# Patient Record
Sex: FEMALE | URBAN - METROPOLITAN AREA
[De-identification: names, ages, dates, MRNs, and addresses within clinical notes are randomized per-mention and may not be internally consistent; named-entity substitution may affect disease eponyms.]

---

## 2023-03-17 ENCOUNTER — OFFICE (OUTPATIENT)
Dept: URBAN - METROPOLITAN AREA CLINIC 90 | Facility: CLINIC | Age: 83
Setting detail: OPHTHALMOLOGY
End: 2023-03-17
Payer: MEDICARE

## 2023-03-17 DIAGNOSIS — H02.834: ICD-10-CM

## 2023-03-17 DIAGNOSIS — H25.13: ICD-10-CM

## 2023-03-17 DIAGNOSIS — H40.033: ICD-10-CM

## 2023-03-17 DIAGNOSIS — H02.831: ICD-10-CM

## 2023-03-17 DIAGNOSIS — H18.513: ICD-10-CM

## 2023-03-17 PROCEDURE — 92002 INTRM OPH EXAM NEW PATIENT: CPT | Performed by: OPHTHALMOLOGY

## 2023-03-17 PROCEDURE — 92020 GONIOSCOPY: CPT | Performed by: OPHTHALMOLOGY

## 2023-03-17 PROCEDURE — 76514 ECHO EXAM OF EYE THICKNESS: CPT | Performed by: OPHTHALMOLOGY

## 2023-03-17 ASSESSMENT — REFRACTION_AUTOREFRACTION
OD_SPHERE: +1.75
OD_AXIS: 070
OD_CYLINDER: -1.50
OS_SPHERE: +1.00
OS_CYLINDER: SPH

## 2023-03-17 ASSESSMENT — TONOMETRY
OD_IOP_MMHG: 18
OS_IOP_MMHG: 18

## 2023-03-17 ASSESSMENT — REFRACTION_CURRENTRX
OD_ADD: +2.50
OD_VPRISM_DIRECTION: PROGS
OD_AXIS: 040
OS_AXIS: 107
OS_CYLINDER: -0.50
OD_CYLINDER: -0.75
OD_OVR_VA: 20/
OS_SPHERE: +0.25
OS_OVR_VA: 20/
OS_ADD: +2.50
OS_VPRISM_DIRECTION: PROGS
OD_SPHERE: +1.00

## 2023-03-17 ASSESSMENT — PACHYMETRY
OS_CT_UM: 521
OD_CT_UM: 536
OD_CT_CORRECTION: 1
OS_CT_CORRECTION: 1

## 2023-03-17 ASSESSMENT — LID POSITION - DERMATOCHALASIS
OD_DERMATOCHALASIS: RUL 2+ 3+
OS_DERMATOCHALASIS: LUL 2+ 3+

## 2023-03-17 ASSESSMENT — KERATOMETRY
OS_AXISANGLE_DEGREES: 060
OD_K1POWER_DIOPTERS: 44.75
OS_K2POWER_DIOPTERS: 43.50
OS_K1POWER_DIOPTERS: 43.25
OD_AXISANGLE_DEGREES: 090
OD_K2POWER_DIOPTERS: 44.75

## 2023-03-17 ASSESSMENT — AXIALLENGTH_DERIVED: OD_AL: 22.7726

## 2023-03-17 ASSESSMENT — SPHEQUIV_DERIVED: OD_SPHEQUIV: 1

## 2023-03-17 ASSESSMENT — VISUAL ACUITY
OS_BCVA: 20/60-
OD_BCVA: 20/60+2

## 2024-06-19 ENCOUNTER — OFFICE (OUTPATIENT)
Dept: URBAN - METROPOLITAN AREA CLINIC 90 | Facility: CLINIC | Age: 84
Setting detail: OPHTHALMOLOGY
End: 2024-06-19
Payer: MEDICARE

## 2024-06-19 DIAGNOSIS — H02.834: ICD-10-CM

## 2024-06-19 DIAGNOSIS — H18.513: ICD-10-CM

## 2024-06-19 DIAGNOSIS — H02.831: ICD-10-CM

## 2024-06-19 DIAGNOSIS — H25.13: ICD-10-CM

## 2024-06-19 DIAGNOSIS — H40.033: ICD-10-CM

## 2024-06-19 PROCEDURE — 92014 COMPRE OPH EXAM EST PT 1/>: CPT | Performed by: OPHTHALMOLOGY

## 2024-06-19 ASSESSMENT — LID POSITION - DERMATOCHALASIS
OD_DERMATOCHALASIS: RUL 2+ 3+
OS_DERMATOCHALASIS: LUL 2+ 3+

## 2024-10-30 ENCOUNTER — OFFICE (OUTPATIENT)
Age: 84
Setting detail: OPHTHALMOLOGY
End: 2024-10-30
Payer: MEDICARE

## 2024-10-30 DIAGNOSIS — H25.11: ICD-10-CM

## 2024-10-30 DIAGNOSIS — H25.13: ICD-10-CM

## 2024-10-30 PROCEDURE — 92136 OPHTHALMIC BIOMETRY: CPT | Mod: RT | Performed by: OPHTHALMOLOGY

## 2024-10-30 PROCEDURE — 92136 OPHTHALMIC BIOMETRY: CPT | Mod: TC | Performed by: OPHTHALMOLOGY

## 2024-10-30 ASSESSMENT — REFRACTION_CURRENTRX
OS_VPRISM_DIRECTION: PROGS
OD_OVR_VA: 20/
OS_CYLINDER: 0.00
OS_ADD: +2.50
OD_VPRISM_DIRECTION: PROGS
OS_VPRISM_DIRECTION: PROGS
OS_SPHERE: +0.25
OD_SPHERE: +1.00
OD_ADD: +3.50
OD_VPRISM_DIRECTION: PROGS
OD_SPHERE: +1.00
OS_AXIS: 180
OD_CYLINDER: -0.75
OD_AXIS: 040
OD_AXIS: 045
OD_ADD: +2.50
OS_OVR_VA: 20/
OS_ADD: +3.25
OD_OVR_VA: 20/
OS_OVR_VA: 20/
OS_SPHERE: -0.25
OS_AXIS: 107
OD_CYLINDER: -0.75
OS_CYLINDER: -0.50

## 2024-10-30 ASSESSMENT — REFRACTION_MANIFEST
OS_SPHERE: +1.25
OD_SPHERE: +1.50
OS_CYLINDER: -0.75
OD_CYLINDER: -1.75
OD_VA1: 20/100+
OS_VA1: 20/60-
OS_AXIS: 180
OD_AXIS: 080

## 2024-10-30 ASSESSMENT — REFRACTION_AUTOREFRACTION
OS_SPHERE: +1.25
OD_SPHERE: +2.00
OS_AXIS: 180
OD_AXIS: 078
OD_CYLINDER: -1.75
OS_CYLINDER: -0.75

## 2024-10-30 ASSESSMENT — CONFRONTATIONAL VISUAL FIELD TEST (CVF)
OS_FINDINGS: FULL
OD_FINDINGS: FULL

## 2024-10-30 ASSESSMENT — KERATOMETRY
OS_K1POWER_DIOPTERS: 43.50
OD_K1POWER_DIOPTERS: 44.25
OS_AXISANGLE_DEGREES: 068
OS_K2POWER_DIOPTERS: 43.75
OD_K2POWER_DIOPTERS: 44.75
OD_AXISANGLE_DEGREES: 010

## 2024-10-30 ASSESSMENT — VISUAL ACUITY
OD_BCVA: 20/60-1
OS_BCVA: 20/250

## 2024-11-04 ENCOUNTER — AMBULATORY SURGERY CENTER (OUTPATIENT)
Dept: URBAN - METROPOLITAN AREA SURGERY 25 | Facility: SURGERY | Age: 84
Setting detail: OPHTHALMOLOGY
End: 2024-11-04
Payer: MEDICARE

## 2024-11-04 DIAGNOSIS — H52.211: ICD-10-CM

## 2024-11-04 DIAGNOSIS — H25.11: ICD-10-CM

## 2024-11-04 PROCEDURE — FEMTO FEMTOSECOND LASER: Mod: GY | Performed by: OPHTHALMOLOGY

## 2024-11-04 PROCEDURE — 66984 XCAPSL CTRC RMVL W/O ECP: CPT | Mod: RT | Performed by: OPHTHALMOLOGY

## 2024-11-05 ENCOUNTER — OFFICE (OUTPATIENT)
Age: 84
Setting detail: OPHTHALMOLOGY
End: 2024-11-05
Payer: MEDICARE

## 2024-11-05 ENCOUNTER — RX ONLY (RX ONLY)
Age: 84
End: 2024-11-05

## 2024-11-05 DIAGNOSIS — Z96.1: ICD-10-CM

## 2024-11-05 PROCEDURE — 99024 POSTOP FOLLOW-UP VISIT: CPT | Performed by: OPHTHALMOLOGY

## 2024-11-05 ASSESSMENT — REFRACTION_MANIFEST
OD_AXIS: 080
OD_VA1: 20/100+
OD_VA1: 20/100+
OS_AXIS: 180
OD_SPHERE: +1.50
OD_SPHERE: +1.50
OS_VA1: 20/60-
OS_CYLINDER: -0.75
OD_CYLINDER: -1.75
OS_SPHERE: +1.25
OD_CYLINDER: -1.75
OS_SPHERE: +1.25
OD_AXIS: 080
OS_AXIS: 180
OS_CYLINDER: -0.75
OS_VA1: 20/60-

## 2024-11-05 ASSESSMENT — PACHYMETRY
OS_CT_CORRECTION: 1
OD_CT_UM: 536
OD_CT_CORRECTION: 1
OS_CT_UM: 521

## 2024-11-05 ASSESSMENT — REFRACTION_CURRENTRX
OD_SPHERE: +1.00
OD_VPRISM_DIRECTION: PROGS
OD_OVR_VA: 20/
OS_SPHERE: +0.25
OD_CYLINDER: -0.75
OS_CYLINDER: 0.00
OD_SPHERE: +1.00
OS_CYLINDER: -0.50
OD_OVR_VA: 20/
OS_OVR_VA: 20/
OS_OVR_VA: 20/
OD_AXIS: 045
OD_SPHERE: +1.00
OS_SPHERE: -0.25
OS_SPHERE: -0.25
OS_SPHERE: +0.25
OS_ADD: +2.50
OD_VPRISM_DIRECTION: PROGS
OS_VPRISM_DIRECTION: PROGS
OD_OVR_VA: 20/
OD_CYLINDER: -0.75
OD_AXIS: 040
OS_ADD: +2.50
OS_ADD: +3.25
OD_ADD: +2.50
OD_CYLINDER: -0.75
OD_SPHERE: +1.00
OD_OVR_VA: 20/
OS_VPRISM_DIRECTION: PROGS
OD_ADD: +3.50
OD_CYLINDER: -0.75
OD_AXIS: 045
OS_OVR_VA: 20/
OS_AXIS: 180
OS_AXIS: 107
OD_VPRISM_DIRECTION: PROGS
OS_ADD: +3.25
OS_CYLINDER: -0.50
OS_VPRISM_DIRECTION: PROGS
OS_CYLINDER: 0.00
OS_AXIS: 107
OS_AXIS: 180
OD_AXIS: 040
OD_VPRISM_DIRECTION: PROGS
OD_ADD: +3.50
OS_OVR_VA: 20/
OD_ADD: +2.50
OS_VPRISM_DIRECTION: PROGS

## 2024-11-05 ASSESSMENT — KERATOMETRY
OS_AXISANGLE_DEGREES: 068
OD_AXISANGLE_DEGREES: 010
OD_K1POWER_DIOPTERS: 44.25
OS_K2POWER_DIOPTERS: 43.75
OS_AXISANGLE_DEGREES: 068
OS_K2POWER_DIOPTERS: 43.75
OD_K1POWER_DIOPTERS: 44.25
OD_AXISANGLE_DEGREES: 010
OS_K1POWER_DIOPTERS: 43.50
OD_K2POWER_DIOPTERS: 44.75
OS_K1POWER_DIOPTERS: 43.50
OD_K2POWER_DIOPTERS: 44.75

## 2024-11-05 ASSESSMENT — CORNEAL EDEMA - MICROCYSTIC EPITHELIAL EDEMA (MCE): OD_MCE: 3+

## 2024-11-05 ASSESSMENT — VISUAL ACUITY
OS_BCVA: 20/HM
OD_BCVA: 20/60-2

## 2024-11-05 ASSESSMENT — REFRACTION_AUTOREFRACTION
OD_SPHERE: +2.00
OS_CYLINDER: -0.75
OD_AXIS: 078
OS_SPHERE: +1.25
OS_AXIS: 180
OD_CYLINDER: -1.75

## 2024-11-05 ASSESSMENT — CONFRONTATIONAL VISUAL FIELD TEST (CVF)
OD_FINDINGS: FULL
OS_FINDINGS: FULL

## 2024-11-06 ENCOUNTER — OFFICE (OUTPATIENT)
Age: 84
Setting detail: OPHTHALMOLOGY
End: 2024-11-06
Payer: MEDICARE

## 2024-11-06 DIAGNOSIS — Z96.1: ICD-10-CM

## 2024-11-06 PROCEDURE — 99024 POSTOP FOLLOW-UP VISIT: CPT | Performed by: OPHTHALMOLOGY

## 2024-11-06 ASSESSMENT — LID POSITION - DERMATOCHALASIS
OS_DERMATOCHALASIS: LUL 2+ 3+
OD_DERMATOCHALASIS: RUL 2+ 3+

## 2024-11-06 ASSESSMENT — PACHYMETRY
OD_CT_UM: 536
OS_CT_CORRECTION: 1
OD_CT_CORRECTION: 1
OS_CT_UM: 521

## 2024-11-06 ASSESSMENT — CORNEAL EDEMA - MICROCYSTIC EPITHELIAL EDEMA (MCE): OD_MCE: 3+

## 2024-11-07 ENCOUNTER — INPATIENT (INPATIENT)
Facility: HOSPITAL | Age: 84
LOS: 0 days | Discharge: HOME | DRG: 117 | End: 2024-11-07
Attending: OPHTHALMOLOGY | Admitting: OPHTHALMOLOGY
Payer: MEDICARE

## 2024-11-07 VITALS
HEIGHT: 60 IN | DIASTOLIC BLOOD PRESSURE: 84 MMHG | TEMPERATURE: 98 F | SYSTOLIC BLOOD PRESSURE: 138 MMHG | OXYGEN SATURATION: 98 % | RESPIRATION RATE: 16 BRPM | WEIGHT: 98.99 LBS | HEART RATE: 84 BPM

## 2024-11-07 VITALS
HEART RATE: 75 BPM | RESPIRATION RATE: 17 BRPM | DIASTOLIC BLOOD PRESSURE: 75 MMHG | OXYGEN SATURATION: 99 % | SYSTOLIC BLOOD PRESSURE: 140 MMHG

## 2024-11-07 DIAGNOSIS — H59.021 CATARACT (LENS) FRAGMENTS IN EYE FOLLOWING CATARACT SURGERY, RIGHT EYE: ICD-10-CM

## 2024-11-07 DIAGNOSIS — Z98.49 CATARACT EXTRACTION STATUS, UNSPECIFIED EYE: Chronic | ICD-10-CM

## 2024-11-07 LAB
ALBUMIN SERPL ELPH-MCNC: 3.6 G/DL — SIGNIFICANT CHANGE UP (ref 3.3–5)
ALP SERPL-CCNC: 58 U/L — SIGNIFICANT CHANGE UP (ref 30–120)
ALT FLD-CCNC: 23 U/L — SIGNIFICANT CHANGE UP (ref 10–60)
ANION GAP SERPL CALC-SCNC: 8 MMOL/L — SIGNIFICANT CHANGE UP (ref 5–17)
APTT BLD: 29.3 SEC — SIGNIFICANT CHANGE UP (ref 24.5–35.6)
AST SERPL-CCNC: 22 U/L — SIGNIFICANT CHANGE UP (ref 10–40)
BASOPHILS # BLD AUTO: 0.06 K/UL — SIGNIFICANT CHANGE UP (ref 0–0.2)
BASOPHILS NFR BLD AUTO: 1.1 % — SIGNIFICANT CHANGE UP (ref 0–2)
BILIRUB SERPL-MCNC: 0.5 MG/DL — SIGNIFICANT CHANGE UP (ref 0.2–1.2)
BUN SERPL-MCNC: 20 MG/DL — SIGNIFICANT CHANGE UP (ref 7–23)
CALCIUM SERPL-MCNC: 9.7 MG/DL — SIGNIFICANT CHANGE UP (ref 8.4–10.5)
CHLORIDE SERPL-SCNC: 101 MMOL/L — SIGNIFICANT CHANGE UP (ref 96–108)
CO2 SERPL-SCNC: 28 MMOL/L — SIGNIFICANT CHANGE UP (ref 22–31)
CREAT SERPL-MCNC: 0.96 MG/DL — SIGNIFICANT CHANGE UP (ref 0.5–1.3)
EGFR: 59 ML/MIN/1.73M2 — LOW
EOSINOPHIL # BLD AUTO: 0.09 K/UL — SIGNIFICANT CHANGE UP (ref 0–0.5)
EOSINOPHIL NFR BLD AUTO: 1.7 % — SIGNIFICANT CHANGE UP (ref 0–6)
GLUCOSE SERPL-MCNC: 96 MG/DL — SIGNIFICANT CHANGE UP (ref 70–99)
HCT VFR BLD CALC: 37.1 % — SIGNIFICANT CHANGE UP (ref 34.5–45)
HGB BLD-MCNC: 12.7 G/DL — SIGNIFICANT CHANGE UP (ref 11.5–15.5)
IMM GRANULOCYTES NFR BLD AUTO: 0.2 % — SIGNIFICANT CHANGE UP (ref 0–0.9)
INR BLD: 1.03 RATIO — SIGNIFICANT CHANGE UP (ref 0.85–1.16)
LYMPHOCYTES # BLD AUTO: 1.05 K/UL — SIGNIFICANT CHANGE UP (ref 1–3.3)
LYMPHOCYTES # BLD AUTO: 19.3 % — SIGNIFICANT CHANGE UP (ref 13–44)
MCHC RBC-ENTMCNC: 31.4 PG — SIGNIFICANT CHANGE UP (ref 27–34)
MCHC RBC-ENTMCNC: 34.2 G/DL — SIGNIFICANT CHANGE UP (ref 32–36)
MCV RBC AUTO: 91.6 FL — SIGNIFICANT CHANGE UP (ref 80–100)
MONOCYTES # BLD AUTO: 0.61 K/UL — SIGNIFICANT CHANGE UP (ref 0–0.9)
MONOCYTES NFR BLD AUTO: 11.2 % — SIGNIFICANT CHANGE UP (ref 2–14)
NEUTROPHILS # BLD AUTO: 3.61 K/UL — SIGNIFICANT CHANGE UP (ref 1.8–7.4)
NEUTROPHILS NFR BLD AUTO: 66.5 % — SIGNIFICANT CHANGE UP (ref 43–77)
NRBC # BLD: 0 /100 WBCS — SIGNIFICANT CHANGE UP (ref 0–0)
PLATELET # BLD AUTO: 226 K/UL — SIGNIFICANT CHANGE UP (ref 150–400)
POTASSIUM SERPL-MCNC: 3.5 MMOL/L — SIGNIFICANT CHANGE UP (ref 3.5–5.3)
POTASSIUM SERPL-SCNC: 3.5 MMOL/L — SIGNIFICANT CHANGE UP (ref 3.5–5.3)
PROT SERPL-MCNC: 7.5 G/DL — SIGNIFICANT CHANGE UP (ref 6–8.3)
PROTHROM AB SERPL-ACNC: 12.1 SEC — SIGNIFICANT CHANGE UP (ref 9.9–13.4)
RBC # BLD: 4.05 M/UL — SIGNIFICANT CHANGE UP (ref 3.8–5.2)
RBC # FLD: 13.3 % — SIGNIFICANT CHANGE UP (ref 10.3–14.5)
SODIUM SERPL-SCNC: 137 MMOL/L — SIGNIFICANT CHANGE UP (ref 135–145)
WBC # BLD: 5.43 K/UL — SIGNIFICANT CHANGE UP (ref 3.8–10.5)
WBC # FLD AUTO: 5.43 K/UL — SIGNIFICANT CHANGE UP (ref 3.8–10.5)

## 2024-11-07 PROCEDURE — 99285 EMERGENCY DEPT VISIT HI MDM: CPT

## 2024-11-07 PROCEDURE — 99221 1ST HOSP IP/OBS SF/LOW 40: CPT

## 2024-11-07 PROCEDURE — 67036 REMOVAL OF INNER EYE FLUID: CPT | Mod: AS,RT

## 2024-11-07 PROCEDURE — 93010 ELECTROCARDIOGRAM REPORT: CPT

## 2024-11-07 PROCEDURE — 71045 X-RAY EXAM CHEST 1 VIEW: CPT | Mod: 26

## 2024-11-07 DEVICE — IMPLANTABLE DEVICE: Type: IMPLANTABLE DEVICE | Site: RIGHT | Status: FUNCTIONAL

## 2024-11-07 ASSESSMENT — REFRACTION_CURRENTRX
OS_AXIS: 107
OD_SPHERE: +1.00
OS_CYLINDER: 0.00
OS_ADD: +3.25
OD_OVR_VA: 20/
OS_AXIS: 180
OD_SPHERE: +1.00
OD_VPRISM_DIRECTION: PROGS
OD_AXIS: 040
OS_OVR_VA: 20/
OS_OVR_VA: 20/
OD_CYLINDER: -0.75
OS_SPHERE: +0.25
OD_CYLINDER: -0.75
OS_ADD: +2.50
OD_OVR_VA: 20/
OD_AXIS: 045
OS_VPRISM_DIRECTION: PROGS
OD_ADD: +3.50
OS_SPHERE: -0.25
OD_ADD: +2.50
OS_CYLINDER: -0.50
OD_VPRISM_DIRECTION: PROGS
OS_VPRISM_DIRECTION: PROGS

## 2024-11-07 ASSESSMENT — REFRACTION_MANIFEST
OS_CYLINDER: -0.75
OS_AXIS: 180
OS_SPHERE: +1.25
OD_SPHERE: +1.50
OD_VA1: 20/100+
OD_CYLINDER: -1.75
OS_VA1: 20/60-
OD_AXIS: 080

## 2024-11-07 ASSESSMENT — REFRACTION_AUTOREFRACTION
OD_AXIS: 078
OD_SPHERE: +2.00
OS_CYLINDER: -0.75
OD_CYLINDER: -1.75
OS_SPHERE: +1.25
OS_AXIS: 180

## 2024-11-07 ASSESSMENT — KERATOMETRY
OS_K1POWER_DIOPTERS: 43.50
OD_K1POWER_DIOPTERS: 44.25
OD_AXISANGLE_DEGREES: 010
OD_K2POWER_DIOPTERS: 44.75
OS_AXISANGLE_DEGREES: 068
OS_K2POWER_DIOPTERS: 43.75

## 2024-11-07 ASSESSMENT — VISUAL ACUITY
OD_BCVA: 20/60-2
OS_BCVA: 20/HM

## 2024-11-07 NOTE — H&P ADULT - ASSESSMENT
Status post cataract surgery with failed removal of lens   Asymptomatic at the moment   S/p cataract surgery 3 days ago   Pt is scheduled for lens removal today with insertion of intraocular lens   Labs reviewed   Pain management as per optho   Monitor vitals     DVT prophylaxis   No need, pt will be discharged after surgery

## 2024-11-07 NOTE — ED PROVIDER NOTE - EYES, MLM
Clear bilaterally, pupils equal, round and reactive to light. R eye slight watery, no acute erythema/ edema.

## 2024-11-07 NOTE — ASU PATIENT PROFILE, ADULT - VISION (WITH CORRECTIVE LENSES IF THE PATIENT USUALLY WEARS THEM):
Right eye/Partially impaired: cannot see medication labels or newsprint, but can see obstacles in path, and the surrounding layout; can count fingers at arm's length

## 2024-11-07 NOTE — ASU DISCHARGE PLAN (ADULT/PEDIATRIC) - CARE PROVIDER_API CALL
Robbin Polk  Ophthalmology  [address] [place] [address], [address]  [place] [address]  Phone: [phone]  Fax: [phone]  Scheduled Appointment: 11/08/2024

## 2024-11-07 NOTE — ASU PATIENT PROFILE, ADULT - ADDITIONAL COMMENTS
Patient has periods of confusion as per daughter. The Daughter is her caregiver and will translate for her. Patient is for emergency right eye sx. As per Daughter the patient has periods of confusion, and this is her baseline.

## 2024-11-07 NOTE — H&P ADULT - NSHPREVIEWOFSYSTEMS_GEN_ALL_CORE
REVIEW OF SYSTEMS:  CONSTITUTIONAL: No fever, weight loss, or fatigue  ENMT:  No difficulty hearing, tinnitus, vertigo; No sinus or throat pain  RESPIRATORY: No cough, wheezing, chills or hemoptysis; No shortness of breath  CARDIOVASCULAR: No chest pain, palpitations, dizziness, or leg swelling  GASTROINTESTINAL: Admits constipation,  No abdominal or epigastric pain. No nausea, vomiting, or hematemesis; No diarrhea   GENITOURINARY: No dysuria, frequency, hematuria, or incontinence  NEUROLOGICAL: No headaches, memory loss, loss of strength, numbness, or tremors  SKIN: No itching, burning, rashes, or lesions   MUSCULOSKELETAL: No pain or sewlling or redness REVIEW OF SYSTEMS:  CONSTITUTIONAL: No fever, weight loss, or fatigue  ENMT:  No difficulty hearing, tinnitus, vertigo; No sinus or throat pain  EYES: Blurry vision   RESPIRATORY: No cough, wheezing, chills or hemoptysis; No shortness of breath  CARDIOVASCULAR: No chest pain, palpitations, dizziness, or leg swelling  GASTROINTESTINAL: No constipation,  No abdominal or epigastric pain. No nausea, vomiting, or hematemesis; No diarrhea   GENITOURINARY: No dysuria, frequency, hematuria, or incontinence  NEUROLOGICAL: No headaches, memory loss, loss of strength, numbness, or tremors  SKIN: No itching, burning, rashes, or lesions   MUSCULOSKELETAL: No pain or swelling or redness

## 2024-11-07 NOTE — ED PROVIDER NOTE - OBJECTIVE STATEMENT
84-year-old female with no significant past medical history presents with status post cataract surgery 3 days ago, failed removal of the lens.  The patient is now here for lens fragment removal, insertion of intraocular lens.  The patient is not having any pain.  Some decreased vision in the right eye.  No acute nausea or vomiting.  No acute headache.  No other acute complaints.

## 2024-11-07 NOTE — H&P ADULT - HISTORY OF PRESENT ILLNESS
84-year-old female with no significant past medical history presents with status post cataract surgery 3 days ago, failed removal of the lens. The patient is now here for lens fragment removal, insertion of intraocular lens. She has   Denies nausea, vomiting, eye pain, headache, chest pain, palpitations.  84-year-old female with no significant past medical history presents with status post cataract surgery 3 days ago, failed removal of the lens. The patient is now here for lens fragment removal, insertion of intraocular lens. She has blurry vision on the right eye.   Denies nausea, vomiting, eye pain, headache, chest pain, palpitations. Does not take any medications.

## 2024-11-07 NOTE — ASU PATIENT PROFILE, ADULT - FALL HARM RISK - HARM RISK INTERVENTIONS

## 2024-11-07 NOTE — ASU PATIENT PROFILE, ADULT - HEALTHCARE QUESTIONS, PROFILE
will speak with surgeon & anesthesia prior to surgery will speak with surgeon & anesthesia prior to surgery , with Daughter.

## 2024-11-07 NOTE — ASU DISCHARGE PLAN (ADULT/PEDIATRIC) - NS MD DC FALL RISK RISK
For information on Fall & Injury Prevention, visit: [email or web address] OR  [email or web address] OR  https://www.cdc.gov/steadi/patient.html

## 2024-11-07 NOTE — ASU DISCHARGE PLAN (ADULT/PEDIATRIC) - PROCEDURE
Vitrectomy, removal of retained lens with fragmatome, insertion of sutured intraocular lens, right eye

## 2024-11-07 NOTE — ASU PREOP CHECKLIST - 1.
Patient for emergent right eye retina sx. Patient was brought into the emergency room as an add-on. Patient is pleconfused, as per daughter this is the patients baseline. Patient is for emergency right eye sx. As per Daughter the patient has periods of confusion, and this is her baseline.

## 2024-11-07 NOTE — H&P ADULT - NSHPLABSRESULTS_GEN_ALL_CORE
Labs:                                Lactate Trend            CAPILLARY BLOOD GLUCOSE          EKG:   Personally Reviewed:  [ ] YES     Imaging:   Personally Reviewed:  [ ] YES Labs:  Reviewed

## 2024-11-07 NOTE — H&P ADULT - NSHPPHYSICALEXAM_GEN_ALL_CORE
GENERAL: patient appears well, no acute distress, appropriate behavior  EYES: sclera clear, no exudates, PERRLA  ENMT: moist mucous membranes, oropharynx clear without erythema, no exudates  LUNGS:  no increased work of breathing, no wheezing appreciated  HEART: no lower extremity edema appreciated  GASTROINTESTINAL: abdomen is nondistended, no palpable masses appreciated  INTEGUMENT: good skin turgor, warm, dry and intact, no lesions appreciated  MUSCULOSKELETAL: No tenderness or swelling present   NEUROLOGIC: awake, alert, oriented x3, strength no obvious sensory deficits

## 2024-11-07 NOTE — ASU DISCHARGE PLAN (ADULT/PEDIATRIC) - FINANCIAL ASSISTANCE
[facility] provides services at a reduced cost to those who are determined to be eligible through [facility]’s financial assistance program. Information regarding [facility]’s financial assistance program can be found by going to [email or web address] or by calling [phone].

## 2024-11-26 PROCEDURE — 99285 EMERGENCY DEPT VISIT HI MDM: CPT

## 2024-11-26 PROCEDURE — V2632: CPT

## 2024-11-26 PROCEDURE — 85025 COMPLETE CBC W/AUTO DIFF WBC: CPT

## 2024-11-26 PROCEDURE — 85730 THROMBOPLASTIN TIME PARTIAL: CPT

## 2024-11-26 PROCEDURE — 71045 X-RAY EXAM CHEST 1 VIEW: CPT

## 2024-11-26 PROCEDURE — 85610 PROTHROMBIN TIME: CPT

## 2024-11-26 PROCEDURE — 80053 COMPREHEN METABOLIC PANEL: CPT

## 2024-11-26 PROCEDURE — 93005 ELECTROCARDIOGRAM TRACING: CPT

## 2024-11-26 PROCEDURE — 36415 COLL VENOUS BLD VENIPUNCTURE: CPT

## 2024-12-03 ENCOUNTER — RX ONLY (RX ONLY)
Age: 84
End: 2024-12-03

## 2024-12-03 ENCOUNTER — OFFICE (OUTPATIENT)
Facility: LOCATION | Age: 84
Setting detail: OPHTHALMOLOGY
End: 2024-12-03
Payer: MEDICARE

## 2024-12-03 DIAGNOSIS — Z96.1: ICD-10-CM

## 2024-12-03 PROCEDURE — 99024 POSTOP FOLLOW-UP VISIT: CPT | Performed by: OPHTHALMOLOGY

## 2024-12-03 ASSESSMENT — PACHYMETRY
OS_CT_CORRECTION: 1
OD_CT_CORRECTION: 1
OS_CT_UM: 521
OD_CT_UM: 536

## 2024-12-03 ASSESSMENT — CORNEAL EDEMA CLINICAL DESCRIPTION: OD_CORNEALEDEMA: ABSENT

## 2024-12-03 ASSESSMENT — TONOMETRY: OD_IOP_MMHG: 16

## 2024-12-03 ASSESSMENT — CORNEAL EDEMA - MICROCYSTIC EPITHELIAL EDEMA (MCE): OD_MCE: ABSENT

## 2024-12-04 ASSESSMENT — REFRACTION_MANIFEST
OD_CYLINDER: -1.75
OS_AXIS: 180
OD_VA1: 20/100+
OD_AXIS: 080
OS_CYLINDER: -0.75
OD_SPHERE: +1.50
OS_SPHERE: +1.25
OS_VA1: 20/60-

## 2024-12-04 ASSESSMENT — REFRACTION_AUTOREFRACTION
OD_SPHERE: +2.00
OD_CYLINDER: -1.75
OS_CYLINDER: -0.75
OS_SPHERE: +1.25
OD_AXIS: 078
OS_AXIS: 180

## 2024-12-04 ASSESSMENT — REFRACTION_CURRENTRX
OD_ADD: +2.50
OS_ADD: +2.50
OD_ADD: +3.50
OD_AXIS: 045
OD_SPHERE: +1.00
OD_AXIS: 040
OS_AXIS: 180
OS_SPHERE: +0.25
OD_OVR_VA: 20/
OS_CYLINDER: 0.00
OD_SPHERE: +1.00
OS_OVR_VA: 20/
OD_CYLINDER: -0.75
OS_SPHERE: -0.25
OS_OVR_VA: 20/
OS_CYLINDER: -0.50
OS_VPRISM_DIRECTION: PROGS
OD_VPRISM_DIRECTION: PROGS
OS_VPRISM_DIRECTION: PROGS
OD_CYLINDER: -0.75
OS_AXIS: 107
OD_VPRISM_DIRECTION: PROGS
OD_OVR_VA: 20/
OS_ADD: +3.25

## 2024-12-04 ASSESSMENT — KERATOMETRY
OD_K2POWER_DIOPTERS: 44.75
OS_AXISANGLE_DEGREES: 068
OD_K1POWER_DIOPTERS: 44.25
OS_K2POWER_DIOPTERS: 43.75
OD_AXISANGLE_DEGREES: 010
OS_K1POWER_DIOPTERS: 43.50

## 2024-12-04 ASSESSMENT — VISUAL ACUITY
OD_BCVA: 20/
OS_BCVA: 20/40-1

## 2025-01-08 ENCOUNTER — OFFICE (OUTPATIENT)
Facility: LOCATION | Age: 85
Setting detail: OPHTHALMOLOGY
End: 2025-01-08
Payer: MEDICARE

## 2025-01-08 DIAGNOSIS — Z96.1: ICD-10-CM

## 2025-01-08 PROCEDURE — 99024 POSTOP FOLLOW-UP VISIT: CPT | Performed by: OPHTHALMOLOGY

## 2025-01-08 ASSESSMENT — PACHYMETRY
OS_CT_UM: 521
OS_CT_CORRECTION: 1
OD_CT_CORRECTION: 1
OD_CT_UM: 536

## 2025-01-08 ASSESSMENT — REFRACTION_MANIFEST
OS_CYLINDER: -0.75
OS_AXIS: 180
OD_CYLINDER: -1.75
OD_AXIS: 080
OD_SPHERE: +1.50
OS_SPHERE: +1.25
OS_VA1: 20/60-
OD_VA1: 20/100+

## 2025-01-08 ASSESSMENT — REFRACTION_CURRENTRX
OD_ADD: +3.50
OS_CYLINDER: 0.00
OS_OVR_VA: 20/
OS_AXIS: 180
OS_ADD: +2.50
OD_ADD: +2.50
OD_CYLINDER: -0.75
OS_SPHERE: +0.25
OS_VPRISM_DIRECTION: PROGS
OD_VPRISM_DIRECTION: PROGS
OS_SPHERE: -0.25
OD_AXIS: 040
OS_VPRISM_DIRECTION: PROGS
OS_AXIS: 107
OD_OVR_VA: 20/
OD_VPRISM_DIRECTION: PROGS
OS_ADD: +3.25
OS_OVR_VA: 20/
OS_CYLINDER: -0.50
OD_OVR_VA: 20/
OD_AXIS: 045
OD_CYLINDER: -0.75
OD_SPHERE: +1.00
OD_SPHERE: +1.00

## 2025-01-08 ASSESSMENT — TONOMETRY: OD_IOP_MMHG: 14

## 2025-01-08 ASSESSMENT — KERATOMETRY
OS_K2POWER_DIOPTERS: 43.75
OD_K1POWER_DIOPTERS: 44.00
OS_K1POWER_DIOPTERS: 43.75
OD_AXISANGLE_DEGREES: 082
OS_AXISANGLE_DEGREES: 090
OD_K2POWER_DIOPTERS: 45.00

## 2025-01-08 ASSESSMENT — CONFRONTATIONAL VISUAL FIELD TEST (CVF)
OD_FINDINGS: FULL
OS_FINDINGS: FULL

## 2025-01-08 ASSESSMENT — CORNEAL EDEMA CLINICAL DESCRIPTION: OD_CORNEALEDEMA: ABSENT

## 2025-01-08 ASSESSMENT — VISUAL ACUITY
OD_BCVA: 20/
OS_BCVA: 20/30

## 2025-01-08 ASSESSMENT — REFRACTION_AUTOREFRACTION
OS_SPHERE: +1.75
OD_CYLINDER: -1.50
OS_CYLINDER: -0.50
OD_SPHERE: +1.50
OS_AXIS: 109
OD_AXIS: 160

## 2025-01-08 ASSESSMENT — CORNEAL EDEMA - MICROCYSTIC EPITHELIAL EDEMA (MCE): OD_MCE: ABSENT

## (undated) DEVICE — DRAPE STERI-DRAPE INCISE 13X13"

## (undated) DEVICE — WARMING BLANKET LOWER ADULT

## (undated) DEVICE — PACK CONSTELLATION POST 23G 10K

## (undated) DEVICE — FORCEP GRIESHABER SERRATED 25G DISP

## (undated) DEVICE — PACK CONSTELLATION POST 25G 20K

## (undated) DEVICE — VENODYNE/SCD SLEEVE CALF MEDIUM

## (undated) DEVICE — SYE-LASER - CONSTELLATION MACHINE #3 1101051201X: Type: DURABLE MEDICAL EQUIPMENT

## (undated) DEVICE — CANNULA ALCON SOFT TIP 23G

## (undated) DEVICE — CONSTELLATION FRAGMENTATION PAK

## (undated) DEVICE — SOL BALANCE SALT 15ML

## (undated) DEVICE — DIATHERMY PROBE 25GA

## (undated) DEVICE — SYSTEM ENTRY OPHTHALMIC VALVED 25G

## (undated) DEVICE — NDL HYPO SAFE 22G X 1.5" (BLACK)

## (undated) DEVICE — PACK VITRECTOMY

## (undated) DEVICE — CANNULA ALCON SOFT TIP 25G

## (undated) DEVICE — HOOK STRAIGHT SINKEY DISP

## (undated) DEVICE — GLV 7 PROTEXIS (WHITE)

## (undated) DEVICE — SHIELD CORNEAL LIGHT 8MM 20/BX

## (undated) DEVICE — ELCTR ERASER BI-P BVL 45DEG 18G

## (undated) DEVICE — SOL IRR BAL SALT + 500ML

## (undated) DEVICE — ELCTR BIPOLAR CORD J&J 12FT DISP

## (undated) DEVICE — DRAPE MICROSCOPE RESIGHT